# Patient Record
Sex: FEMALE | Race: WHITE
[De-identification: names, ages, dates, MRNs, and addresses within clinical notes are randomized per-mention and may not be internally consistent; named-entity substitution may affect disease eponyms.]

---

## 2021-06-30 ENCOUNTER — HOSPITAL ENCOUNTER (EMERGENCY)
Dept: HOSPITAL 11 - JP.ED | Age: 36
Discharge: HOME | End: 2021-06-30
Payer: COMMERCIAL

## 2021-06-30 DIAGNOSIS — U07.1: Primary | ICD-10-CM

## 2021-06-30 PROCEDURE — 86140 C-REACTIVE PROTEIN: CPT

## 2021-06-30 PROCEDURE — 87635 SARS-COV-2 COVID-19 AMP PRB: CPT

## 2021-06-30 PROCEDURE — 83690 ASSAY OF LIPASE: CPT

## 2021-06-30 PROCEDURE — 85025 COMPLETE CBC W/AUTO DIFF WBC: CPT

## 2021-06-30 PROCEDURE — 80053 COMPREHEN METABOLIC PANEL: CPT

## 2021-06-30 PROCEDURE — 99284 EMERGENCY DEPT VISIT MOD MDM: CPT

## 2021-06-30 PROCEDURE — U0002 COVID-19 LAB TEST NON-CDC: HCPCS

## 2021-06-30 PROCEDURE — 36415 COLL VENOUS BLD VENIPUNCTURE: CPT

## 2021-06-30 NOTE — EDM.PDOC
ED HPI GENERAL MEDICAL PROBLEM





- General


Chief Complaint: General


Stated Complaint: DEHYRATED


Time Seen by Provider: 06/30/21 18:50


Source of Information: Reports: Patient, Family


History Limitations: Reports: No Limitations





- History of Present Illness


INITIAL COMMENTS - FREE TEXT/NARRATIVE: 





36-year-old female who is generally healthy, has been having problems with 

persistent fever, recurring headaches and body aches for the past 10 days.  She 

did have 3 tick bites, and was seen in the clinic 4 days ago and was started on 

presumptive tickborne disease with doxycycline.  She is running a fever again 

today after 4 doses of doxycycline so wanted to be checked again.  She also had 

one episode of emesis today which is unusual, she has some abdominal cramping 

and just does not feel well.  No rashes, denies a sore throat or vision change. 

She did not get Covid vaccinated and only takes holistic treatments.


Onset: Gradual


Duration: Day(s): (Symptoms have been waxing and waning for 10 days)


Location: Reports: Generalized (Generalized body aches and joint pains, 

headaches)


Associated Symptoms: Reports: Fever/Chills, Headaches, Loss of Appetite, 

Malaise, Other (Some weight loss)


Treatments PTA: Reports: Other (see below) (She has had 4 doses of doxycycline)





- Related Data


                                    Allergies











Allergy/AdvReac Type Severity Reaction Status Date / Time


 


No Known Allergies Allergy   Verified 06/30/21 18:39











Home Meds: 


                                    Home Meds





Doxycycline [Vibramycin] 100 mg PO BID 06/30/21 [History]











Past Medical History


HEENT History: Reports: Impaired Vision


OB/GYN History: Reports: Pregnancy





Social & Family History





- Tobacco Use


Tobacco Use Status *Q: Never Tobacco User





- Caffeine Use


Caffeine Use: Reports: Coffee





- Alcohol Use


Days Per Week of Alcohol Use: 1


Number of Drinks Per Day: 1


Total Drinks Per Week: 1





- Recreational Drug Use


Recreational Drug Use: No





ED ROS GENERAL





- Review of Systems


Review Of Systems: See Below


Constitutional: Reports: Fever, Chills, Malaise, Decreased Appetite, Weight Loss


HEENT: Denies: Throat Pain, Vision Change


Respiratory: Denies: Shortness of Breath, Cough


Cardiovascular: Denies: Chest Pain, Palpitations


Endocrine: Reports: Fatigue


GI/Abdominal: Reports: Abdominal Pain, Nausea, Vomiting


: Reports: No Symptoms


Musculoskeletal: Reports: Other (Generalized joint aches and stiffness, no 

swelling)


Skin: Denies: Rash





ED EXAM, GENERAL





- Physical Exam


Exam: See Below


Exam Limited By: No Limitations


General Appearance: Alert, No Apparent Distress, Other (Looks uncomfortable but 

not distressed)


Eye Exam: Bilateral Eye: Normal Inspection


Head: Atraumatic, Normocephalic


Neck: Supple, Other (Mild soreness with neck movement)


Respiratory/Chest: No Respiratory Distress, Lungs Clear


Cardiovascular: Regular Rate, Rhythm.  No: Tachycardia


GI/Abdominal: Soft, Non-Tender


Extremities: Normal Inspection


Neurological: Alert, Oriented


Psychiatric: Flat Affect


Skin Exam: Warm, Dry





Course





- Vital Signs


Last Recorded V/S: 


                                Last Vital Signs











Temp  101.4 F H  06/30/21 18:41


 


Pulse  81   06/30/21 18:41


 


Resp  14   06/30/21 18:41


 


BP  103/70   06/30/21 18:41


 


Pulse Ox  96   06/30/21 18:41














- Orders/Labs/Meds


Labs: 


                                Laboratory Tests











  06/30/21 06/30/21 06/30/21 Range/Units





  19:10 19:10 19:49 


 


WBC  3.4 L    (4.5-11.0)  K/uL


 


RBC  4.43    (3.30-5.50)  M/uL


 


Hgb  13.4    (12.0-15.0)  g/dL


 


Hct  40.5    (36.0-48.0)  %


 


MCV  91    (80-98)  fL


 


MCH  30    (27-31)  pg


 


MCHC  33    (32-36)  %


 


Plt Count  111 L    (150-400)  K/uL


 


Add Manual Diff  Yes    


 


Neutrophils % (Manual)  55    (36-66)  %


 


Band Neutrophils %  3 L    (5-11)  %


 


Lymphocytes % (Manual)  36    (24-44)  %


 


Monocytes % (Manual)  6    (2-6)  %


 


Atypical Lymphocytes  Few    


 


Sodium   139 L   (140-148)  mmol/L


 


Potassium   3.8   (3.6-5.2)  mmol/L


 


Chloride   102   (100-108)  mmol/L


 


Carbon Dioxide   27   (21-32)  mmol/L


 


Anion Gap   13.8   (5.0-14.0)  mmol/L


 


BUN   11   (7-18)  mg/dL


 


Creatinine   1.0   (0.6-1.0)  mg/dL


 


Est Cr Clr Drug Dosing   67.80   mL/min


 


Estimated GFR (MDRD)   > 60   (>60)  


 


Glucose   95   ()  mg/dL


 


Calcium   7.9 L   (8.5-10.1)  mg/dL


 


Total Bilirubin   0.3   (0.2-1.0)  mg/dL


 


AST   23   (15-37)  U/L


 


ALT   23   (12-78)  U/L


 


Alkaline Phosphatase   51   ()  U/L


 


C-Reactive Protein   0.05   (0.0-0.3)  mg/dL


 


Total Protein   6.2 L   (6.4-8.2)  g/dL


 


Albumin   2.9 L   (3.4-5.0)  g/dL


 


Globulin   3.3   (2.3-3.5)  g/dL


 


Albumin/Globulin Ratio   0.9 L   (1.2-2.2)  


 


Lipase   359   ()  U/L


 


SARS CoV-2 RNA Rapid BOZENA    Positive H  











Meds: 


Medications














Discontinued Medications














Generic Name Dose Route Start Last Admin





  Trade Name Freq  PRN Reason Stop Dose Admin


 


Sodium Chloride  1,000 mls @ 1,000 mls/hr  06/30/21 19:15  06/30/21 19:19





  Normal Saline  IV   1,000 mls/hr





  ASDIRECTED [facility]   Administration














- Re-Assessments/Exams


Free Text/Narrative Re-Assessment/Exam: 





06/30/21 19:48


An IV was started, patient will be given a bolus of normal saline, CBC CMP CRP 

and Covid test obtained.


06/30/21 22:26


White count returned mildly low, the rest of her labs are generally reassuring. 

 Covid test was positive.





Departure





- Departure


Time of Disposition: 20:47


Disposition: Home, Self-Care 01


Clinical Impression: 


 COVID-19








- Discharge Information


Instructions:  COVID-19


Referrals: 


PCP,None [Primary Care Provider] - 


Forms:  ED Department Discharge


Care Plan Goals: 


Rest, fluids, and quarantine away from others who have not been vaccinated for 

at least another 5 to 6 days.  Return if worsening such as difficulty breathing 

or persistent vomiting.





Sepsis Event Note (ED)





- Evaluation


Sepsis Screening Result: No Definite Risk





- Focused Exam


Vital Signs: 


                                   Vital Signs











  Temp Pulse Resp BP Pulse Ox


 


 06/30/21 18:41  101.4 F H  81  14  103/70  96


 


 06/30/21 18:39  101.4 F H  81  14  103/70  96